# Patient Record
Sex: MALE | Race: WHITE
[De-identification: names, ages, dates, MRNs, and addresses within clinical notes are randomized per-mention and may not be internally consistent; named-entity substitution may affect disease eponyms.]

---

## 2018-01-07 ENCOUNTER — HOSPITAL ENCOUNTER (EMERGENCY)
Dept: HOSPITAL 63 - ER | Age: 68
Discharge: HOME | End: 2018-01-07
Payer: MEDICARE

## 2018-01-07 VITALS — DIASTOLIC BLOOD PRESSURE: 80 MMHG | SYSTOLIC BLOOD PRESSURE: 145 MMHG

## 2018-01-07 VITALS — WEIGHT: 195 LBS | HEIGHT: 69 IN | BODY MASS INDEX: 28.88 KG/M2

## 2018-01-07 DIAGNOSIS — R05: Primary | ICD-10-CM

## 2018-01-07 PROCEDURE — 99283 EMERGENCY DEPT VISIT LOW MDM: CPT

## 2018-01-07 NOTE — PHYS DOC
Adult General


Chief Complaint


Chief Complaint:  COUGH





HPI


HPI





Patient is a 67-year-old male presents to the emergency department complaints 

of persisting cough. Cough is nonproductive. Patient is already on antibiotics 

for the last 3 days but the cough remains persistent. Patient has no other 

complaints. Patient denies any weight wheezing or shortness of air, his main 

issue is that he is having trouble sleeping because of the cough.





Review of Systems


Review of Systems





Constitutional: Denies fever or chills []





HENT: As per history of present illness. No headache, no eye pain, no ear pain[]


Respiratory: As per history of present illness


Cardiovascular: No chest pain


GI: Denies abdominal pain,





Musculoskeletal: Denies back pain or joint pain or extremity pain or swelling


Integument: Denies rash or skin lesions []


Neurologic: Denies headache, focal weakness or sensory changes []








All other systems were reviewed and found to be within normal limits, except as 

documented in this note.





Allergies


Allergies





Allergies








Coded Allergies Type Severity Reaction Last Updated Verified


 


  No Known Drug Allergies    1/7/18 No











Physical Exam


Physical Exam





Constitutional: Well developed, well nourished, no acute distress, non-toxic 

appearance. Not ill-appearing. Not coughing in the emergency department


HENT: Normocephalic, atraumatic, bilateral external ears normal, oropharynx 

moist, no oral exudates, nose normal. Airways patent


Eyes:  EOMI, conjunctiva normal, no discharge. [] 


Neck: Normal range of motion, no tenderness, supple, no stridor. No LAD, no 

meningeal signs


Cardiovascular: Normal perfusion, RRR


Lungs & Thorax:  Bilateral breath sounds clear to auscultation, no tachypnea, 

no wheezing, no rales, no rhonchi


Abdomen: No distention


Skin: Warm, dry, no erythema, no rash. [] 


Back: Normal alignment


Extremities: No tenderness, , ROM intact, no edema. No signs of DVT


Neurologic: Alert and oriented X 3, normal motor function, no focal deficits 

noted. []


Psychologic: Affect normal, judgement normal, mood normal. []





EKG


EKG


[]





Radiology/Procedures


Radiology/Procedures


[]





Course & Med Decision Making


Course & Med Decision Making


Pertinent Labs and Imaging studies reviewed. (See chart for details)





[]





Dragon Disclaimer


Dragon Disclaimer


This electronic medical record was generated, in whole or in part, using a 

voice recognition dictation system.





Departure


Departure:


Impression:  


 Primary Impression:  


 Cough


Disposition:  01 HOME, SELF-CARE


Condition:  STABLE


Referrals:  


PCP,NO (PCP)


Please follow with your doctor for recheck and reevaluation in 3-5 days


Patient Instructions:  Cough, Adult


Scripts


Dextromethorphan Hbr (TUSSIN COUGH) 15 Mg/5 Ml Liquid


15 MG PO BID for 5 Days, LIQUID


   Prov: KIM DAWSON MD         1/7/18 


Benzonatate (TESSALON PERLE) 100 Mg Capsule


1 CAP PO TID, #21 CAP


   Prov: KIM DAWSON MD         1/7/18











KIM DAWSON MD Jan 7, 2018 21:30